# Patient Record
Sex: FEMALE | Race: BLACK OR AFRICAN AMERICAN | NOT HISPANIC OR LATINO | Employment: FULL TIME | URBAN - METROPOLITAN AREA
[De-identification: names, ages, dates, MRNs, and addresses within clinical notes are randomized per-mention and may not be internally consistent; named-entity substitution may affect disease eponyms.]

---

## 2024-02-19 ENCOUNTER — OFFICE VISIT (OUTPATIENT)
Dept: PRIMARY CARE | Facility: CLINIC | Age: 59
End: 2024-02-19
Payer: COMMERCIAL

## 2024-02-19 VITALS
BODY MASS INDEX: 22.42 KG/M2 | RESPIRATION RATE: 12 BRPM | HEART RATE: 69 BPM | DIASTOLIC BLOOD PRESSURE: 76 MMHG | OXYGEN SATURATION: 96 % | WEIGHT: 111 LBS | SYSTOLIC BLOOD PRESSURE: 118 MMHG

## 2024-02-19 DIAGNOSIS — S46.912A MUSCLE STRAIN OF LEFT UPPER ARM, INITIAL ENCOUNTER: Primary | ICD-10-CM

## 2024-02-19 DIAGNOSIS — L02.92 BOIL: ICD-10-CM

## 2024-02-19 PROBLEM — M79.601 PAIN OF RIGHT UPPER EXTREMITY: Status: ACTIVE | Noted: 2024-02-19

## 2024-02-19 PROBLEM — E55.9 VITAMIN D DEFICIENCY: Status: ACTIVE | Noted: 2024-02-19

## 2024-02-19 PROBLEM — M54.12 CERVICAL RADICULOPATHY, CHRONIC: Status: ACTIVE | Noted: 2024-02-19

## 2024-02-19 PROBLEM — L73.2 AXILLARY HIDRADENITIS SUPPURATIVA: Status: ACTIVE | Noted: 2024-02-19

## 2024-02-19 PROBLEM — M25.522 BILATERAL ELBOW JOINT PAIN: Status: ACTIVE | Noted: 2024-02-19

## 2024-02-19 PROBLEM — M75.21 TENDONITIS OF UPPER BICEPS TENDON OF RIGHT SHOULDER: Status: ACTIVE | Noted: 2024-02-19

## 2024-02-19 PROBLEM — M25.521 BILATERAL ELBOW JOINT PAIN: Status: ACTIVE | Noted: 2024-02-19

## 2024-02-19 PROBLEM — R22.32 MASS OF LEFT ELBOW: Status: ACTIVE | Noted: 2024-02-19

## 2024-02-19 PROBLEM — L03.90 CELLULITIS: Status: ACTIVE | Noted: 2024-02-19

## 2024-02-19 PROBLEM — M25.522 LEFT ELBOW PAIN: Status: ACTIVE | Noted: 2024-02-19

## 2024-02-19 PROBLEM — M79.646 THUMB PAIN: Status: ACTIVE | Noted: 2024-02-19

## 2024-02-19 PROBLEM — R53.83 FATIGUE: Status: ACTIVE | Noted: 2024-02-19

## 2024-02-19 PROCEDURE — 99214 OFFICE O/P EST MOD 30 MIN: CPT | Performed by: INTERNAL MEDICINE

## 2024-02-19 RX ORDER — CEFADROXIL 500 MG/1
500 CAPSULE ORAL 2 TIMES DAILY
Qty: 20 CAPSULE | Refills: 0 | Status: SHIPPED | OUTPATIENT
Start: 2024-02-19 | End: 2024-02-29

## 2024-02-19 NOTE — PROGRESS NOTES
Subjective   Chief complaint: Latanya Callahan is a 59 y.o. 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 ).    HPI:  Latanya presents for L arm pain.     The pain is worse at work. 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   She states when this happens she has to keep her arm flexed and only use her right arm. It from her shoulder to mid-upper arm. She had hx of surgery but denies similar pain, and unsure what the surgery was called. She has been taking ibuprofen and icing at home with minimal relief, but she doesn't want to take pills for this constantly.        Objective   /76   Pulse 69   Resp 12   Wt 50.3 kg (111 lb)   SpO2 96%   BMI 22.42 kg/m²   Physical Exam  Vitals reviewed.   Constitutional:       Appearance: Normal appearance.   Cardiovascular:      Rate and Rhythm: Normal rate and regular rhythm.   Pulmonary:      Effort: Pulmonary effort is normal.      Breath sounds: Normal breath sounds.   Musculoskeletal:         General: No swelling, tenderness or signs of injury. Normal range of motion.      Comments: No tenderness by incision.   Skin:     General: Skin is warm and dry.      Findings: No bruising or erythema.      Comments: Well healed incision on L proximal lateral lower arm   Neurological:      Mental Status: She is alert and oriented to person, place, and time.   Psychiatric:         Mood and Affect: Mood normal.         Behavior: Behavior normal.         Thought Content: Thought content normal.         Judgment: Judgment normal.         I have reviewed and reconciled the medication list with the patient today. No current outpatient medications on file.     Imaging:  No results found.     Labs reviewed:    Lab Results   Component Value Date    WBC 5.5 11/04/2020    HGB 10.9 (L) 11/04/2022    HCT 34.0 (L) 11/04/2022     11/04/2020    CHOL 187  11/04/2020    TRIG 72 11/04/2020    HDL 66.4 11/04/2020    ALT 28 11/04/2020    AST 24 11/04/2020     11/04/2020    K 4.1 11/04/2020     11/04/2020    CREATININE 0.62 11/04/2020    BUN 9 11/04/2020    CO2 26 11/04/2020    TSH 0.70 11/04/2020       Assessment/Plan   Problem List Items Addressed This Visit       Muscle strain of left upper arm - Primary     Use Tylenol and Aspacream for pain.  Try and rest and ice as much as work permits. Follow up in 1 mo.            Continue current medications as listed - use tylenol and aspecreme  Follow up in March for annual - cbc, cmp, vit D, tsh, t3, t4, lipids, A1c

## 2024-02-20 ENCOUNTER — APPOINTMENT (OUTPATIENT)
Dept: SURGERY | Facility: CLINIC | Age: 59
End: 2024-02-20
Payer: COMMERCIAL

## 2024-05-13 ENCOUNTER — OFFICE VISIT (OUTPATIENT)
Dept: PRIMARY CARE | Facility: CLINIC | Age: 59
End: 2024-05-13
Payer: COMMERCIAL

## 2024-05-13 VITALS
SYSTOLIC BLOOD PRESSURE: 105 MMHG | OXYGEN SATURATION: 96 % | HEART RATE: 52 BPM | BODY MASS INDEX: 23.23 KG/M2 | RESPIRATION RATE: 12 BRPM | WEIGHT: 115 LBS | DIASTOLIC BLOOD PRESSURE: 62 MMHG

## 2024-05-13 DIAGNOSIS — H00.014 HORDEOLUM EXTERNUM OF LEFT UPPER EYELID: Primary | ICD-10-CM

## 2024-05-13 DIAGNOSIS — H92.02 EAR PAIN, LEFT: ICD-10-CM

## 2024-05-13 DIAGNOSIS — H61.23 BILATERAL IMPACTED CERUMEN: ICD-10-CM

## 2024-05-13 PROBLEM — H00.019 STYE: Status: ACTIVE | Noted: 2024-05-13

## 2024-05-13 PROCEDURE — 99214 OFFICE O/P EST MOD 30 MIN: CPT | Performed by: INTERNAL MEDICINE

## 2024-05-13 RX ORDER — ASPIRIN 81 MG
5 TABLET, DELAYED RELEASE (ENTERIC COATED) ORAL 2 TIMES DAILY
Qty: 15 ML | Refills: 0 | Status: SHIPPED | OUTPATIENT
Start: 2024-05-13 | End: 2024-05-23

## 2024-05-13 NOTE — ASSESSMENT & PLAN NOTE
Take 500 mg keflex every 6 hours for a week  Apply gentamycin ointment 3x/day  Continue to apply warm compress

## 2024-05-13 NOTE — PROGRESS NOTES
Subjective   Chief complaint: Latanya Callahan is a 59 y.o. female who presents for Stye (Pt is here for stye in left eye, hears water in left ear for 3-4 months/).    HPI:  60 yo female here due to a stye in her left eye that has been present for 3-4 months ago. She states that it went away last week, but came back. She has tried putting a warm compress on it, but notes no improvement. She is upset as this has caused her to miss work.        Objective   /62   Pulse 52   Resp 12   Wt 52.2 kg (115 lb)   SpO2 96%   BMI 23.23 kg/m²   Physical Exam  Constitutional:       General: She is not in acute distress.  Eyes:      Extraocular Movements: Extraocular movements intact.   Pulmonary:      Effort: Pulmonary effort is normal.   Musculoskeletal:      Cervical back: Neck supple.   Neurological:      Mental Status: She is alert.   Psychiatric:         Mood and Affect: Mood normal.         Behavior: Behavior is cooperative.         I have reviewed and reconciled the medication list with the patient today. No current outpatient medications on file.     Imaging:  No results found.     Labs reviewed:    Lab Results   Component Value Date    WBC 5.5 11/04/2020    HGB 10.9 (L) 11/04/2022    HCT 34.0 (L) 11/04/2022     11/04/2020    CHOL 187 11/04/2020    TRIG 72 11/04/2020    HDL 66.4 11/04/2020    ALT 28 11/04/2020    AST 24 11/04/2020     11/04/2020    K 4.1 11/04/2020     11/04/2020    CREATININE 0.62 11/04/2020    BUN 9 11/04/2020    CO2 26 11/04/2020    TSH 0.70 11/04/2020       Assessment/Plan   Problem List Items Addressed This Visit       Stye - Primary     Take 500 mg keflex every 6 hours for a week  Apply gentamycin ointment 3x/day  Continue to apply warm compress         Ear pain, left     Apply 3-4 drops of Debrox in the left ear three times a day           Other Visit Diagnoses       Bilateral impacted cerumen                Continue current medications as listed  Follow up as needed

## 2024-05-14 DIAGNOSIS — H44.003 INFECTION OF BOTH EYES: ICD-10-CM

## 2024-05-14 RX ORDER — CEPHALEXIN 500 MG/1
500 CAPSULE ORAL 2 TIMES DAILY
Qty: 14 CAPSULE | Refills: 0 | Status: SHIPPED | OUTPATIENT
Start: 2024-05-14 | End: 2024-05-21

## 2024-05-14 RX ORDER — GENTAMICIN SULFATE 3 MG/ML
1-2 SOLUTION/ DROPS OPHTHALMIC EVERY 4 HOURS
Qty: 15 ML | Refills: 0 | Status: SHIPPED | OUTPATIENT
Start: 2024-05-14 | End: 2024-06-08

## 2024-05-16 ENCOUNTER — TELEPHONE (OUTPATIENT)
Dept: PRIMARY CARE | Facility: CLINIC | Age: 59
End: 2024-05-16
Payer: COMMERCIAL

## 2024-05-16 NOTE — LETTER
Date: May 16, 2024  RE:  Latanya Callahan  :  1965      To Whom It May Concern:    Our patient, Latanya, has been under our care and now may return back to work without restrictions.    Their return to work date is:  2024    If you have questions concerning this patient's immediate care, please feel free to contact our office at [phone].    Sincerely,      Patience Dalton, LPN  Supervising Provider: Frederick Tamayo MD

## 2024-05-16 NOTE — LETTER
May 24, 2024     Patient: Latanya Callahan   YOB: 1965   Date of Visit: 5/16/2024       To Whom It May Concern:    Latanya Callahan was seen in my clinic on 5/16/2024. Please excuse Latanya for her absence from work 5/23/24-5/26/24 and return on Monday 5/27/24.  If you have any questions or concerns, please don't hesitate to call.         Sincerely,         Trishence BERNA Hoffman        CC: No Recipients

## 2024-05-17 ENCOUNTER — APPOINTMENT (OUTPATIENT)
Dept: PRIMARY CARE | Facility: CLINIC | Age: 59
End: 2024-05-17
Payer: COMMERCIAL

## 2024-05-21 ENCOUNTER — OFFICE VISIT (OUTPATIENT)
Dept: PRIMARY CARE | Facility: CLINIC | Age: 59
End: 2024-05-21
Payer: COMMERCIAL

## 2024-05-21 VITALS
BODY MASS INDEX: 23.23 KG/M2 | DIASTOLIC BLOOD PRESSURE: 80 MMHG | HEART RATE: 65 BPM | OXYGEN SATURATION: 95 % | WEIGHT: 115 LBS | RESPIRATION RATE: 12 BRPM | SYSTOLIC BLOOD PRESSURE: 128 MMHG

## 2024-05-21 DIAGNOSIS — H00.014 HORDEOLUM EXTERNUM OF LEFT UPPER EYELID: Primary | ICD-10-CM

## 2024-05-21 DIAGNOSIS — H92.02 EAR PAIN, LEFT: ICD-10-CM

## 2024-05-21 DIAGNOSIS — H00.019 HORDEOLUM EXTERNUM, UNSPECIFIED LATERALITY: ICD-10-CM

## 2024-05-21 DIAGNOSIS — H93.8X3 SENSATION OF PLUGGED EAR ON BOTH SIDES: ICD-10-CM

## 2024-05-21 PROCEDURE — 99213 OFFICE O/P EST LOW 20 MIN: CPT | Performed by: INTERNAL MEDICINE

## 2024-05-21 NOTE — ASSESSMENT & PLAN NOTE
Patient did not improve after the treatment take 500 mg keflex every 6 hours for a week  Apply gentamycin ointment 3x/day  Continue to apply warm compress the treatment.  Referred to ophthalmologist to the care of the stye.

## 2024-05-21 NOTE — PROGRESS NOTES
Subjective   Chief complaint: Latanya Callahan is a 59 y.o. female who presents for Follow-up (Pt here for accidentally put glue in ear instead of using ear drops. ).    HPI:  Follow-up stye in the left eye patient was given eye ointment with eyedrops and ibuprofen oral antibiotic and the stye did not improve and patient was reluctant to see an ophthalmologist to the care of the stye now she realized that if not getting better and she need to see an ophthalmologist will make arrangement for urgent appointment to squeeze or drain the stye.  Patient has wax in her left ear she was given Debrox unfortunately the patient accidentally while she woke up from sleep she instead of reaching to the Debrox for her ears she put a nail glue for the nail polish and she went to the emergency room at [facility] and they give her referral for ear nose and throat.        Objective   /80   Pulse 65   Resp 12   Wt 52.2 kg (115 lb)   SpO2 95%   BMI 23.23 kg/m²   Physical Exam  Vitals reviewed.   Constitutional:       Appearance: Normal appearance.   HENT:      Head: Normocephalic and atraumatic.      Right Ear: External ear normal.      Left Ear: There is impacted cerumen.   Cardiovascular:      Rate and Rhythm: Normal rate and regular rhythm.   Pulmonary:      Effort: Pulmonary effort is normal.      Breath sounds: Normal breath sounds.   Abdominal:      General: Bowel sounds are normal.      Palpations: Abdomen is soft.   Musculoskeletal:      Cervical back: Neck supple.   Skin:     General: Skin is warm and dry.   Neurological:      General: No focal deficit present.      Mental Status: She is alert.   Psychiatric:         Mood and Affect: Mood normal.         Behavior: Behavior is cooperative.         I have reviewed and reconciled the medication list with the patient today.   Current Outpatient Medications:     carbamide peroxide (Debrox) 6.5 % otic solution, Administer 5 drops into each ear 2 times a day for 10 days.,  Disp: 15 mL, Rfl: 0    cephalexin (Keflex) 500 mg capsule, Take 1 capsule (500 mg) by mouth 2 times a day for 7 days., Disp: 14 capsule, Rfl: 0    gentamicin (Garamycin) 0.3 % ophthalmic solution, Administer 1-2 drops into both eyes every 4 hours for 25 days., Disp: 15 mL, Rfl: 0     Imaging:  No results found.     Labs reviewed:    Lab Results   Component Value Date    WBC 5.5 11/04/2020    HGB 10.9 (L) 11/04/2022    HCT 34.0 (L) 11/04/2022     11/04/2020    CHOL 187 11/04/2020    TRIG 72 11/04/2020    HDL 66.4 11/04/2020    ALT 28 11/04/2020    AST 24 11/04/2020     11/04/2020    K 4.1 11/04/2020     11/04/2020    CREATININE 0.62 11/04/2020    BUN 9 11/04/2020    CO2 26 11/04/2020    TSH 0.70 11/04/2020       Assessment/Plan   Problem List Items Addressed This Visit       Stye - Primary     Patient did not improve after the treatment take 500 mg keflex every 6 hours for a week  Apply gentamycin ointment 3x/day  Continue to apply warm compress the treatment.  Referred to ophthalmologist to the care of the stye.         Ear pain, left     By mistake the patient was placed here glue in her left ear she was seen in the emergency room and then she was referred to the ENT I will try to make her an urgent appointment for the ENT she claims she did not hit that area.          Other Visit Diagnoses       Sensation of plugged ear on both sides                Continue current medications as listed  Follow up in as needed

## 2024-05-21 NOTE — ASSESSMENT & PLAN NOTE
By mistake the patient was placed here glue in her left ear she was seen in the emergency room and then she was referred to the ENT I will try to make her an urgent appointment for the ENT she claims she did not hit that area.

## 2024-05-23 DIAGNOSIS — H92.02 EAR PAIN, LEFT: ICD-10-CM

## 2024-05-23 RX ORDER — IBUPROFEN 800 MG/1
800 TABLET ORAL 3 TIMES DAILY PRN
Qty: 180 TABLET | Refills: 3 | Status: SHIPPED | OUTPATIENT
Start: 2024-05-23 | End: 2025-05-23

## 2024-05-24 ENCOUNTER — OFFICE VISIT (OUTPATIENT)
Dept: PRIMARY CARE | Facility: CLINIC | Age: 59
End: 2024-05-24
Payer: COMMERCIAL

## 2024-05-24 VITALS
OXYGEN SATURATION: 98 % | SYSTOLIC BLOOD PRESSURE: 142 MMHG | RESPIRATION RATE: 12 BRPM | HEART RATE: 47 BPM | DIASTOLIC BLOOD PRESSURE: 74 MMHG

## 2024-05-24 DIAGNOSIS — H00.014 HORDEOLUM EXTERNUM OF LEFT UPPER EYELID: ICD-10-CM

## 2024-05-24 DIAGNOSIS — H92.02 EAR PAIN, LEFT: Primary | ICD-10-CM

## 2024-05-24 DIAGNOSIS — H61.21 IMPACTED CERUMEN OF RIGHT EAR: ICD-10-CM

## 2024-05-24 PROCEDURE — 69200 CLEAR OUTER EAR CANAL: CPT | Performed by: INTERNAL MEDICINE

## 2024-05-24 PROCEDURE — 99214 OFFICE O/P EST MOD 30 MIN: CPT | Performed by: INTERNAL MEDICINE

## 2024-05-24 NOTE — PROGRESS NOTES
Subjective   Chief complaint: Latanya Callahan is a 59 y.o. female who presents for Follow-up (Pt c/o left side of face is swollen , pt has appt for May 30th with ENT ).    HPI:  HPI  Patient presents with complaint of left ear pain.  She states that she administered  what she thought to be her eardrops but had accidentally grabbed the wrong bottle and put 10 drops of ear glue down into her left ear.  She states that this occurred about a week ago and she has had decreased hearing ever since.  The ear is painful and worsened by palpation.      Objective   Vitals:    05/24/24 1305   BP: 142/74   Pulse: (!) 47   Resp: 12   SpO2: 98%     Physical Exam  Constitutional:       General: She is not in acute distress.  HENT:      Head:      Comments: Otoscopic exam:  wax right ear,  hard dry substance in left ear  The left ear is tender to palpation  Eyes:      Extraocular Movements: Extraocular movements intact.   Pulmonary:      Effort: Pulmonary effort is normal.   Musculoskeletal:      Cervical back: Neck supple.   Neurological:      Mental Status: She is alert.   Psychiatric:         Mood and Affect: Mood normal.         Behavior: Behavior is cooperative.         I have reviewed and reconciled the medication list with the patient today.   Current Outpatient Medications:     gentamicin (Garamycin) 0.3 % ophthalmic solution, Administer 1-2 drops into both eyes every 4 hours for 25 days., Disp: 15 mL, Rfl: 0    ibuprofen 800 mg tablet, Take 1 tablet (800 mg) by mouth 3 times a day as needed for mild pain (1 - 3) (pain)., Disp: 180 tablet, Rfl: 3     Imaging:  No results found.     Labs reviewed:    Lab Results   Component Value Date    WBC 5.5 11/04/2020    HGB 10.9 (L) 11/04/2022    HCT 34.0 (L) 11/04/2022     11/04/2020    CHOL 187 11/04/2020    TRIG 72 11/04/2020    HDL 66.4 11/04/2020    ALT 28 11/04/2020    AST 24 11/04/2020     11/04/2020    K 4.1 11/04/2020     11/04/2020    CREATININE 0.62 11/04/2020     BUN 9 11/04/2020    CO2 26 11/04/2020    TSH 0.70 11/04/2020       Assessment/Plan   Problem List Items Addressed This Visit       Stye     Patient did not improve after the treatment take 500 mg keflex every 6 hours for a week  Apply gentamycin ointment 3x/day  Continue to apply warm compress the treatment.  Referred to ophthalmologist to the care of the stye.         Ear pain, left - Primary     I attempted to remove hardened nail glue from the left ear using curette and forceps.  I flushed ear with warm water.  Small amount of substance removed however there is still quite a bit of glue in the ear.  Patient to follow-up with ENT, referral given         Impacted cerumen of right ear     I flushed and cleaned the right ear with a flex loop for 10 minutes.  Small amount of wax removed and wax remains deep in the ear.  Patient to follow-up with ENT, referral given.            Continue current medications as listed

## 2024-05-24 NOTE — ASSESSMENT & PLAN NOTE
I flushed and cleaned the right ear with a flex loop for 10 minutes.  Small amount of wax removed and wax remains deep in the ear.  Patient to follow-up with ENT, referral given.

## 2024-05-24 NOTE — ASSESSMENT & PLAN NOTE
I attempted to remove hardened nail glue from the left ear using curette and forceps.  I flushed ear with warm water.  Small amount of substance removed however there is still quite a bit of glue in the ear.  Patient to follow-up with ENT, referral given

## 2024-07-08 ENCOUNTER — APPOINTMENT (OUTPATIENT)
Dept: OTOLARYNGOLOGY | Facility: CLINIC | Age: 59
End: 2024-07-08
Payer: COMMERCIAL

## 2024-09-04 ENCOUNTER — APPOINTMENT (OUTPATIENT)
Dept: OPHTHALMOLOGY | Facility: CLINIC | Age: 59
End: 2024-09-04
Payer: COMMERCIAL

## 2024-11-12 ENCOUNTER — OFFICE VISIT (OUTPATIENT)
Dept: PRIMARY CARE | Facility: CLINIC | Age: 59
End: 2024-11-12
Payer: COMMERCIAL

## 2024-11-12 VITALS
RESPIRATION RATE: 12 BRPM | WEIGHT: 111 LBS | OXYGEN SATURATION: 98 % | SYSTOLIC BLOOD PRESSURE: 117 MMHG | BODY MASS INDEX: 22.42 KG/M2 | HEART RATE: 55 BPM | DIASTOLIC BLOOD PRESSURE: 75 MMHG

## 2024-11-12 DIAGNOSIS — R51.9 LEFT-SIDED FACE PAIN: Primary | ICD-10-CM

## 2024-11-12 PROBLEM — T16.2XXA FOREIGN BODY IN LEFT EAR, INITIAL ENCOUNTER: Status: ACTIVE | Noted: 2024-05-29

## 2024-11-12 PROBLEM — R00.1 BRADYCARDIA: Status: ACTIVE | Noted: 2024-10-08

## 2024-11-12 PROCEDURE — 99214 OFFICE O/P EST MOD 30 MIN: CPT | Performed by: INTERNAL MEDICINE

## 2024-11-12 RX ORDER — AMOXICILLIN AND CLAVULANATE POTASSIUM 875; 125 MG/1; MG/1
875 TABLET, FILM COATED ORAL 2 TIMES DAILY
Qty: 20 TABLET | Refills: 0 | Status: SHIPPED | OUTPATIENT
Start: 2024-11-12

## 2024-11-12 NOTE — PROGRESS NOTES
Subjective   Chief complaint: Latanya Callahan is a 59 y.o. female who presents for Follow-up ( patient being seen f/u for glue in ear.   Patient complains of left-sided face swelling).    HPI:  60 yo female presents with 1 day of left sided face swelling, difficulty chewing, and pain.  Has plans to get left tooth removed.   Recent infection treated with cipro.    Objective   /75   Pulse 55   Resp 12   Wt 50.3 kg (111 lb)   SpO2 98%   BMI 22.42 kg/m²   Physical Exam  Constitutional:       Appearance: Normal appearance.   HENT:      Head: Normocephalic and atraumatic.      Comments: Left sided facial stiffness and pain  Cardiovascular:      Rate and Rhythm: Normal rate and regular rhythm.      Heart sounds: Normal heart sounds.   Pulmonary:      Effort: Pulmonary effort is normal.      Breath sounds: Normal breath sounds.   Musculoskeletal:      Cervical back: Normal range of motion and neck supple.   Skin:     General: Skin is warm and dry.   Neurological:      General: No focal deficit present.      Mental Status: She is alert and oriented to person, place, and time.   Psychiatric:         Mood and Affect: Mood normal.         Behavior: Behavior normal.         Thought Content: Thought content normal.         Judgment: Judgment normal.         I have reviewed and reconciled the medication list with the patient today.   Current Outpatient Medications:     ibuprofen 800 mg tablet, Take 1 tablet (800 mg) by mouth 3 times a day as needed for mild pain (1 - 3) (pain)., Disp: 180 tablet, Rfl: 3     Imaging:  No results found.     Labs reviewed:    Lab Results   Component Value Date    WBC 5.5 11/04/2020    HGB 10.9 (L) 11/04/2022    HCT 34.0 (L) 11/04/2022     11/04/2020    CHOL 187 11/04/2020    TRIG 72 11/04/2020    HDL 66.4 11/04/2020    ALT 28 11/04/2020    AST 24 11/04/2020     11/04/2020    K 4.1 11/04/2020     11/04/2020    CREATININE 0.62 11/04/2020    BUN 9 11/04/2020    CO2 26  11/04/2020    TSH 0.70 11/04/2020       Assessment/Plan   Problem List Items Addressed This Visit       Left-sided face pain - Primary     Difficulty chewing, left sided facial pain, and stiffness  No erythema with minimal swelling  Scheduled an appointment to get left sided tooth removed  Suspected infection, rx augmentin             Continue current medications as listed  Follow up 3 months

## 2024-11-12 NOTE — ASSESSMENT & PLAN NOTE
Difficulty chewing, left sided facial pain, and stiffness  No erythema with minimal swelling  Scheduled an appointment to get left sided tooth removed  Suspected infection, rx augmentin